# Patient Record
Sex: MALE | Race: WHITE | Employment: OTHER | URBAN - METROPOLITAN AREA
[De-identification: names, ages, dates, MRNs, and addresses within clinical notes are randomized per-mention and may not be internally consistent; named-entity substitution may affect disease eponyms.]

---

## 2022-03-18 PROBLEM — K44.9 HIATAL HERNIA: Status: ACTIVE | Noted: 2021-05-06

## 2022-06-13 ENCOUNTER — HOSPITAL ENCOUNTER (OUTPATIENT)
Dept: GENERAL RADIOLOGY | Age: 67
Discharge: HOME | End: 2022-06-16

## 2022-06-13 ENCOUNTER — HOSPITAL ENCOUNTER (OUTPATIENT)
Dept: PREADMISSION TESTING | Age: 67
Discharge: HOME | End: 2022-06-16
Payer: MEDICARE

## 2022-06-13 VITALS
SYSTOLIC BLOOD PRESSURE: 142 MMHG | RESPIRATION RATE: 16 BRPM | OXYGEN SATURATION: 96 % | HEIGHT: 62 IN | BODY MASS INDEX: 28.87 KG/M2 | TEMPERATURE: 97.3 F | DIASTOLIC BLOOD PRESSURE: 78 MMHG | WEIGHT: 156.9 LBS | HEART RATE: 65 BPM

## 2022-06-13 LAB
ALBUMIN SERPL-MCNC: 3.6 G/DL (ref 3.2–4.6)
ALBUMIN/GLOB SERPL: 1.1 {RATIO} (ref 1.2–3.5)
ALP SERPL-CCNC: 64 U/L (ref 50–136)
ALT SERPL-CCNC: 21 U/L (ref 12–65)
ANION GAP SERPL CALC-SCNC: 8 MMOL/L (ref 7–16)
APPEARANCE UR: CLEAR
APTT PPP: 25.3 SEC (ref 24.1–35.1)
AST SERPL-CCNC: 17 U/L (ref 15–37)
BASOPHILS # BLD: 0 K/UL (ref 0–0.2)
BASOPHILS NFR BLD: 1 % (ref 0–2)
BILIRUB SERPL-MCNC: 1.2 MG/DL (ref 0.2–1.1)
BILIRUB UR QL: NEGATIVE
BUN SERPL-MCNC: 11 MG/DL (ref 8–23)
CALCIUM SERPL-MCNC: 9 MG/DL (ref 8.3–10.4)
CHLORIDE SERPL-SCNC: 107 MMOL/L (ref 98–107)
CO2 SERPL-SCNC: 24 MMOL/L (ref 21–32)
COLOR UR: YELLOW
CREAT SERPL-MCNC: 0.92 MG/DL (ref 0.8–1.5)
DIFFERENTIAL METHOD BLD: ABNORMAL
EKG ATRIAL RATE: 59 BPM
EKG DIAGNOSIS: NORMAL
EKG P AXIS: 35 DEGREES
EKG P-R INTERVAL: 168 MS
EKG Q-T INTERVAL: 420 MS
EKG QRS DURATION: 84 MS
EKG QTC CALCULATION (BAZETT): 415 MS
EKG R AXIS: 16 DEGREES
EKG T AXIS: 3 DEGREES
EKG VENTRICULAR RATE: 59 BPM
EOSINOPHIL # BLD: 0.1 K/UL (ref 0–0.8)
EOSINOPHIL NFR BLD: 1 % (ref 0.5–7.8)
ERYTHROCYTE [DISTWIDTH] IN BLOOD BY AUTOMATED COUNT: 12.4 % (ref 11.9–14.6)
GLOBULIN SER CALC-MCNC: 3.2 G/DL (ref 2.3–3.5)
GLUCOSE SERPL-MCNC: 95 MG/DL (ref 65–100)
GLUCOSE UR STRIP.AUTO-MCNC: NEGATIVE MG/DL
HCT VFR BLD AUTO: 43.7 % (ref 41.1–50.3)
HGB BLD-MCNC: 15.4 G/DL (ref 13.6–17.2)
HGB UR QL STRIP: NEGATIVE
IMM GRANULOCYTES # BLD AUTO: 0 K/UL (ref 0–0.5)
IMM GRANULOCYTES NFR BLD AUTO: 0 % (ref 0–5)
INR PPP: 1.1
KETONES UR QL STRIP.AUTO: NEGATIVE MG/DL
LEUKOCYTE ESTERASE UR QL STRIP.AUTO: NEGATIVE
LYMPHOCYTES # BLD: 2.1 K/UL (ref 0.5–4.6)
LYMPHOCYTES NFR BLD: 34 % (ref 13–44)
MCH RBC QN AUTO: 33.8 PG (ref 26.1–32.9)
MCHC RBC AUTO-ENTMCNC: 35.2 G/DL (ref 31.4–35)
MCV RBC AUTO: 95.8 FL (ref 79.6–97.8)
MONOCYTES # BLD: 0.7 K/UL (ref 0.1–1.3)
MONOCYTES NFR BLD: 11 % (ref 4–12)
NEUTS SEG # BLD: 3.3 K/UL (ref 1.7–8.2)
NEUTS SEG NFR BLD: 53 % (ref 43–78)
NITRITE UR QL STRIP.AUTO: NEGATIVE
NRBC # BLD: 0 K/UL (ref 0–0.2)
PH UR STRIP: 7 [PH] (ref 5–9)
PLATELET # BLD AUTO: 261 K/UL (ref 150–450)
PMV BLD AUTO: 9.2 FL (ref 9.4–12.3)
POTASSIUM SERPL-SCNC: 4 MMOL/L (ref 3.5–5.1)
PROT SERPL-MCNC: 6.8 G/DL (ref 6.3–8.2)
PROT UR STRIP-MCNC: NEGATIVE MG/DL
PROTHROMBIN TIME: 14.5 SEC (ref 12.6–14.5)
RBC # BLD AUTO: 4.56 M/UL (ref 4.23–5.6)
SODIUM SERPL-SCNC: 139 MMOL/L (ref 136–145)
SP GR UR REFRACTOMETRY: 1.01 (ref 1–1.02)
UROBILINOGEN UR QL STRIP.AUTO: 1 EU/DL (ref 0.2–1)
WBC # BLD AUTO: 6.2 K/UL (ref 4.3–11.1)

## 2022-06-13 PROCEDURE — 85025 COMPLETE CBC W/AUTO DIFF WBC: CPT

## 2022-06-13 PROCEDURE — 71046 X-RAY EXAM CHEST 2 VIEWS: CPT

## 2022-06-13 PROCEDURE — 85730 THROMBOPLASTIN TIME PARTIAL: CPT

## 2022-06-13 PROCEDURE — 81003 URINALYSIS AUTO W/O SCOPE: CPT

## 2022-06-13 PROCEDURE — 85610 PROTHROMBIN TIME: CPT

## 2022-06-13 PROCEDURE — 80053 COMPREHEN METABOLIC PANEL: CPT

## 2022-06-13 NOTE — PERIOP NOTE
Results for Celeste Gibbs (MRN [id number]) as of 6/13/2022 14:33   Ref.  Range 6/13/2022 10:29   Sodium Latest Ref Range: 136 - 145 mmol/L 139   Potassium Latest Ref Range: 3.5 - 5.1 mmol/L 4.0   Chloride Latest Ref Range: 98 - 107 mmol/L 107   CO2 Latest Ref Range: 21 - 32 mmol/L 24   BUN,BUNPL Latest Ref Range: 8 - 23 MG/DL 11   Creatinine Latest Ref Range: 0.8 - 1.5 MG/DL 0.92   Anion Gap Latest Ref Range: 7 - 16 mmol/L 8   GFR Non- Latest Ref Range: >60 ml/min/1.73m2 >60   GFR African American Latest Ref Range: >60 ml/min/1.73m2 >60   GLUCOSE, FASTING,GF Latest Ref Range: 65 - 100 mg/dL 95   CALCIUM, SERUM, [id number] Latest Ref Range: 8.3 - 10.4 MG/DL 9.0   ALBUMIN/GLOBULIN RATIO Latest Ref Range: 1.2 - 3.5   1.1 (L)   Total Protein Latest Ref Range: 6.3 - 8.2 g/dL 6.8   Albumin Latest Ref Range: 3.2 - 4.6 g/dL 3.6   Globulin Latest Ref Range: 2.3 - 3.5 g/dL 3.2   Alk Phosphatase Latest Ref Range: 50 - 136 U/L 64   ALT Latest Ref Range: 12 - 65 U/L 21   AST Latest Ref Range: 15 - 37 U/L 17   Bilirubin Latest Ref Range: 0.2 - 1.1 MG/DL 1.2 (H)   WBC Latest Ref Range: 4.3 - 11.1 K/uL 6.2   RBC Latest Ref Range: 4.23 - 5.6 M/uL 4.56   Hemoglobin Quant Latest Ref Range: 13.6 - 17.2 g/dL 15.4   Hematocrit Latest Ref Range: 41.1 - 50.3 % 43.7   MCV Latest Ref Range: 79.6 - 97.8 FL 95.8   MCH Latest Ref Range: 26.1 - 32.9 PG 33.8 (H)   MCHC Latest Ref Range: 31.4 - 35.0 g/dL 35.2 (H)   MPV Latest Ref Range: 9.4 - 12.3 FL 9.2 (L)   RDW Latest Ref Range: 11.9 - 14.6 % 12.4   Platelet Count Latest Ref Range: 150 - 450 K/uL 261   Absolute Mono # Latest Ref Range: 0.1 - 1.3 K/UL 0.7   Eosinophils % Latest Ref Range: 0.5 - 7.8 % 1   Basophils Absolute Latest Ref Range: 0.0 - 0.2 K/UL 0.0   Differential Type Latest Units:   AUTOMATED   Seg Neutrophils Latest Ref Range: 43 - 78 % 53   Segs Absolute Latest Ref Range: 1.7 - 8.2 K/UL 3.3   Lymphocytes Latest Ref Range: 13 - 44 % 34   Absolute Lymph # Latest Ref Range: 0.5 - 4.6 K/UL 2.1   Monocytes Latest Ref Range: 4.0 - 12.0 % 11   Absolute Eos # Latest Ref Range: 0.0 - 0.8 K/UL 0.1   Basophils Latest Ref Range: 0.0 - 2.0 % 1   Immature Granulocytes Latest Ref Range: 0.0 - 5.0 % 0   Nucleated Red Blood Cells Latest Ref Range: 0.0 - 0.2 K/uL 0.00   Prothrombin Time Latest Ref Range: 12.6 - 14.5 sec 14.5   INR Latest Units:   1.1   PTT Latest Ref Range: 24.1 - 35.1 SEC 25.3   Color, UA Latest Units:   YELLOW   Glucose, UA Latest Units: mg/dL Negative   Bilirubin, Urine Latest Ref Range: NEG   Negative   Ketones, Urine Latest Ref Range: NEG mg/dL Negative   Specific Gravity, UA Latest Ref Range: 1.001 - 1.023   1.014   Blood, Urine Latest Ref Range: NEG   Negative   Protein, UA Latest Ref Range: NEG mg/dL Negative   Urobilinogen, Urine Latest Ref Range: 0.2 - 1.0 EU/dL 1.0   Nitrite, Urine Latest Ref Range: NEG   Negative   Leukocyte Esterase, Urine Latest Ref Range: NEG   Negative   Appearance Latest Units:   CLEAR   pH, Urine Latest Ref Range: 5.0 - 9.0   7.0

## 2022-06-13 NOTE — PERIOP NOTE
Patient verified name and     Order for consent was not found in EHR ; patient verified. Type II surgery, walk-in assessment complete. Labs per surgeon: CBC, CMP, PT/PTT, UA, CXR  Labs per anesthesia protocol: NO ADDITIONAL  EKG: done today    [facility] approved surgical skin cleanser and instructions given per hospital policy. Patient provided with and instructed on educational handouts including Guide to Surgery, Pain Management, Hand Hygiene, Blood Transfusion Education, and [facility] Brochure. Patient answered medical/surgical history questions at their best of ability. All prior to admission medications documented in [facility]. Original medication prescription bottle not visualized during patient appointment. Patient instructed to hold all vitamins 7 days prior to surgery and NSAIDS 5 days prior to surgery, patient verbalized understanding. Patient teach back successful and patient demonstrates knowledge of instructions.

## 2022-06-13 NOTE — PERIOP NOTE
PLEASE CONTINUE TAKING ALL PRESCRIPTION MEDICATIONS UP TO THE DAY OF SURGERY UNLESS OTHERWISE DIRECTED BELOW. DISCONTINUE all vitamins and supplements y days prior to surgery. DISCONTINUE Non-Steriodal Anti-Inflammatory (NSAIDS) such as Advil and Aleve 5 days prior to surgery. Home Medications to take  the day of surgery   Omeprazole (Prilosec)             Home Medications   to Hold           Comments       On the day before surgery please take Acetaminophen 1000mg in the morning and then again before bed. You may substitute for Tylenol 650 mg. Please do not bring home medications with you on the day of surgery unless otherwise directed by your nurse. If you are instructed to bring home medications, please give them to your nurse as they will be administered by the nursing staff. If you have any questions, please call [address] [place] [address] [phone] or [facility] [phone]. A copy of this note was provided to the patient for reference.

## 2022-06-17 NOTE — PERIOP NOTE
Directly informed patient and or family member of pre op arrival time 200 on 6/20. All questions answered. Pre op instructions reviewed. Left contact information for any additional questions or needs.

## 2022-06-20 ENCOUNTER — ANESTHESIA (OUTPATIENT)
Dept: SURGERY | Age: 67
DRG: 328 | End: 2022-06-20
Payer: MEDICARE

## 2022-06-20 ENCOUNTER — ANESTHESIA EVENT (OUTPATIENT)
Dept: SURGERY | Age: 67
DRG: 328 | End: 2022-06-20
Payer: MEDICARE

## 2022-06-20 ENCOUNTER — HOSPITAL ENCOUNTER (INPATIENT)
Age: 67
LOS: 1 days | Discharge: HOME | DRG: 328 | End: 2022-06-21
Attending: SURGERY | Admitting: SURGERY
Payer: MEDICARE

## 2022-06-20 DIAGNOSIS — K44.9 HIATAL HERNIA: Primary | ICD-10-CM

## 2022-06-20 PROCEDURE — 2720000010 HC SURG SUPPLY STERILE: Performed by: SURGERY

## 2022-06-20 PROCEDURE — 6360000002 HC RX W HCPCS: Performed by: ANESTHESIOLOGY

## 2022-06-20 PROCEDURE — 6370000000 HC RX 637 (ALT 250 FOR IP): Performed by: PHYSICIAN ASSISTANT

## 2022-06-20 PROCEDURE — 7100000000 HC PACU RECOVERY - FIRST 15 MIN: Performed by: SURGERY

## 2022-06-20 PROCEDURE — 6360000002 HC RX W HCPCS: Performed by: PHYSICIAN ASSISTANT

## 2022-06-20 PROCEDURE — 2580000003 HC RX 258

## 2022-06-20 PROCEDURE — 2709999900 HC NON-CHARGEABLE SUPPLY: Performed by: SURGERY

## 2022-06-20 PROCEDURE — 43246 EGD PLACE GASTROSTOMY TUBE: CPT | Performed by: SURGERY

## 2022-06-20 PROCEDURE — 2500000003 HC RX 250 WO HCPCS: Performed by: PHYSICIAN ASSISTANT

## 2022-06-20 PROCEDURE — 3600000014 HC SURGERY LEVEL 4 ADDTL 15MIN: Performed by: SURGERY

## 2022-06-20 PROCEDURE — 2500000003 HC RX 250 WO HCPCS: Performed by: NURSE ANESTHETIST, CERTIFIED REGISTERED

## 2022-06-20 PROCEDURE — 2580000003 HC RX 258: Performed by: ANESTHESIOLOGY

## 2022-06-20 PROCEDURE — 0DH68UZ INSERTION OF FEEDING DEVICE INTO STOMACH, VIA NATURAL OR ARTIFICIAL OPENING ENDOSCOPIC: ICD-10-PCS | Performed by: SURGERY

## 2022-06-20 PROCEDURE — 3700000000 HC ANESTHESIA ATTENDED CARE: Performed by: SURGERY

## 2022-06-20 PROCEDURE — 3600000004 HC SURGERY LEVEL 4 BASE: Performed by: SURGERY

## 2022-06-20 PROCEDURE — 7100000001 HC PACU RECOVERY - ADDTL 15 MIN: Performed by: SURGERY

## 2022-06-20 PROCEDURE — 43281 LAP PARAESOPHAG HERN REPAIR: CPT | Performed by: PHYSICIAN ASSISTANT

## 2022-06-20 PROCEDURE — 0BQT4ZZ REPAIR DIAPHRAGM, PERCUTANEOUS ENDOSCOPIC APPROACH: ICD-10-PCS | Performed by: SURGERY

## 2022-06-20 PROCEDURE — C1727 CATH, BAL TIS DIS, NON-VAS: HCPCS | Performed by: SURGERY

## 2022-06-20 PROCEDURE — 43246 EGD PLACE GASTROSTOMY TUBE: CPT | Performed by: PHYSICIAN ASSISTANT

## 2022-06-20 PROCEDURE — 6360000002 HC RX W HCPCS: Performed by: SURGERY

## 2022-06-20 PROCEDURE — 2580000003 HC RX 258: Performed by: PHYSICIAN ASSISTANT

## 2022-06-20 PROCEDURE — 43281 LAP PARAESOPHAG HERN REPAIR: CPT | Performed by: SURGERY

## 2022-06-20 PROCEDURE — 6370000000 HC RX 637 (ALT 250 FOR IP): Performed by: ANESTHESIOLOGY

## 2022-06-20 PROCEDURE — 2700000000 HC OXYGEN THERAPY PER DAY

## 2022-06-20 PROCEDURE — 1100000000 HC RM PRIVATE

## 2022-06-20 PROCEDURE — 6360000002 HC RX W HCPCS

## 2022-06-20 PROCEDURE — 3700000001 HC ADD 15 MINUTES (ANESTHESIA): Performed by: SURGERY

## 2022-06-20 PROCEDURE — 2500000003 HC RX 250 WO HCPCS: Performed by: SURGERY

## 2022-06-20 PROCEDURE — 2580000003 HC RX 258: Performed by: NURSE ANESTHETIST, CERTIFIED REGISTERED

## 2022-06-20 PROCEDURE — 6360000002 HC RX W HCPCS: Performed by: NURSE ANESTHETIST, CERTIFIED REGISTERED

## 2022-06-20 RX ORDER — SODIUM CHLORIDE 0.9 % (FLUSH) 0.9 %
5-40 SYRINGE (ML) INJECTION PRN
Status: DISCONTINUED | OUTPATIENT
Start: 2022-06-20 | End: 2022-06-20 | Stop reason: HOSPADM

## 2022-06-20 RX ORDER — SODIUM CHLORIDE 0.9 % (FLUSH) 0.9 %
5-40 SYRINGE (ML) INJECTION EVERY 12 HOURS SCHEDULED
Status: DISCONTINUED | OUTPATIENT
Start: 2022-06-20 | End: 2022-06-20 | Stop reason: HOSPADM

## 2022-06-20 RX ORDER — HYDROMORPHONE HYDROCHLORIDE 1 MG/ML
1 INJECTION, SOLUTION INTRAMUSCULAR; INTRAVENOUS; SUBCUTANEOUS EVERY 4 HOURS PRN
Status: DISCONTINUED | OUTPATIENT
Start: 2022-06-20 | End: 2022-06-21 | Stop reason: HOSPADM

## 2022-06-20 RX ORDER — SODIUM CHLORIDE 9 MG/ML
INJECTION, SOLUTION INTRAVENOUS PRN
Status: DISCONTINUED | OUTPATIENT
Start: 2022-06-20 | End: 2022-06-20 | Stop reason: HOSPADM

## 2022-06-20 RX ORDER — ACETAMINOPHEN 500 MG
1000 TABLET ORAL ONCE
Status: COMPLETED | OUTPATIENT
Start: 2022-06-20 | End: 2022-06-20

## 2022-06-20 RX ORDER — IPRATROPIUM BROMIDE AND ALBUTEROL SULFATE 2.5; .5 MG/3ML; MG/3ML
1 SOLUTION RESPIRATORY (INHALATION)
Status: DISCONTINUED | OUTPATIENT
Start: 2022-06-20 | End: 2022-06-20 | Stop reason: HOSPADM

## 2022-06-20 RX ORDER — SODIUM CHLORIDE 0.9 % (FLUSH) 0.9 %
5-40 SYRINGE (ML) INJECTION EVERY 12 HOURS SCHEDULED
Status: DISCONTINUED | OUTPATIENT
Start: 2022-06-20 | End: 2022-06-21 | Stop reason: HOSPADM

## 2022-06-20 RX ORDER — FENTANYL CITRATE 50 UG/ML
INJECTION, SOLUTION INTRAMUSCULAR; INTRAVENOUS PRN
Status: DISCONTINUED | OUTPATIENT
Start: 2022-06-20 | End: 2022-06-20 | Stop reason: SDUPTHER

## 2022-06-20 RX ORDER — ACETAMINOPHEN 325 MG/1
650 TABLET ORAL EVERY 6 HOURS
Status: DISCONTINUED | OUTPATIENT
Start: 2022-06-20 | End: 2022-06-21 | Stop reason: HOSPADM

## 2022-06-20 RX ORDER — ENOXAPARIN SODIUM 100 MG/ML
40 INJECTION SUBCUTANEOUS DAILY
Status: DISCONTINUED | OUTPATIENT
Start: 2022-06-21 | End: 2022-06-21 | Stop reason: HOSPADM

## 2022-06-20 RX ORDER — ROCURONIUM BROMIDE 10 MG/ML
INJECTION, SOLUTION INTRAVENOUS PRN
Status: DISCONTINUED | OUTPATIENT
Start: 2022-06-20 | End: 2022-06-20 | Stop reason: SDUPTHER

## 2022-06-20 RX ORDER — HALOPERIDOL 5 MG/ML
1 INJECTION INTRAMUSCULAR
Status: DISCONTINUED | OUTPATIENT
Start: 2022-06-20 | End: 2022-06-20 | Stop reason: HOSPADM

## 2022-06-20 RX ORDER — ONDANSETRON 2 MG/ML
4 INJECTION INTRAMUSCULAR; INTRAVENOUS EVERY 6 HOURS PRN
Status: DISCONTINUED | OUTPATIENT
Start: 2022-06-20 | End: 2022-06-21 | Stop reason: HOSPADM

## 2022-06-20 RX ORDER — NEOSTIGMINE METHYLSULFATE 1 MG/ML
INJECTION, SOLUTION INTRAVENOUS PRN
Status: DISCONTINUED | OUTPATIENT
Start: 2022-06-20 | End: 2022-06-20 | Stop reason: SDUPTHER

## 2022-06-20 RX ORDER — METRONIDAZOLE 500 MG/100ML
500 INJECTION, SOLUTION INTRAVENOUS EVERY 8 HOURS
Status: COMPLETED | OUTPATIENT
Start: 2022-06-20 | End: 2022-06-20

## 2022-06-20 RX ORDER — ONDANSETRON 4 MG/1
4 TABLET, ORALLY DISINTEGRATING ORAL EVERY 4 HOURS PRN
Status: DISCONTINUED | OUTPATIENT
Start: 2022-06-20 | End: 2022-06-21 | Stop reason: HOSPADM

## 2022-06-20 RX ORDER — GLYCOPYRROLATE 0.2 MG/ML
INJECTION INTRAMUSCULAR; INTRAVENOUS PRN
Status: DISCONTINUED | OUTPATIENT
Start: 2022-06-20 | End: 2022-06-20 | Stop reason: SDUPTHER

## 2022-06-20 RX ORDER — HYDROMORPHONE HYDROCHLORIDE 2 MG/ML
0.5 INJECTION, SOLUTION INTRAMUSCULAR; INTRAVENOUS; SUBCUTANEOUS EVERY 5 MIN PRN
Status: DISCONTINUED | OUTPATIENT
Start: 2022-06-20 | End: 2022-06-20 | Stop reason: HOSPADM

## 2022-06-20 RX ORDER — PANTOPRAZOLE SODIUM 40 MG/1
40 TABLET, DELAYED RELEASE ORAL
Status: DISCONTINUED | OUTPATIENT
Start: 2022-06-21 | End: 2022-06-21 | Stop reason: HOSPADM

## 2022-06-20 RX ORDER — SODIUM CHLORIDE, SODIUM LACTATE, POTASSIUM CHLORIDE, CALCIUM CHLORIDE 600; 310; 30; 20 MG/100ML; MG/100ML; MG/100ML; MG/100ML
INJECTION, SOLUTION INTRAVENOUS CONTINUOUS
Status: DISCONTINUED | OUTPATIENT
Start: 2022-06-20 | End: 2022-06-21 | Stop reason: HOSPADM

## 2022-06-20 RX ORDER — FENTANYL CITRATE 50 UG/ML
100 INJECTION, SOLUTION INTRAMUSCULAR; INTRAVENOUS
Status: DISCONTINUED | OUTPATIENT
Start: 2022-06-20 | End: 2022-06-20 | Stop reason: HOSPADM

## 2022-06-20 RX ORDER — PROCHLORPERAZINE EDISYLATE 5 MG/ML
5 INJECTION INTRAMUSCULAR; INTRAVENOUS
Status: DISCONTINUED | OUTPATIENT
Start: 2022-06-20 | End: 2022-06-20 | Stop reason: HOSPADM

## 2022-06-20 RX ORDER — LIDOCAINE HYDROCHLORIDE 20 MG/ML
INJECTION, SOLUTION EPIDURAL; INFILTRATION; INTRACAUDAL; PERINEURAL PRN
Status: DISCONTINUED | OUTPATIENT
Start: 2022-06-20 | End: 2022-06-20 | Stop reason: SDUPTHER

## 2022-06-20 RX ORDER — KETOROLAC TROMETHAMINE 30 MG/ML
INJECTION, SOLUTION INTRAMUSCULAR; INTRAVENOUS PRN
Status: DISCONTINUED | OUTPATIENT
Start: 2022-06-20 | End: 2022-06-20 | Stop reason: SDUPTHER

## 2022-06-20 RX ORDER — DEXAMETHASONE SODIUM PHOSPHATE 4 MG/ML
INJECTION, SOLUTION INTRA-ARTICULAR; INTRALESIONAL; INTRAMUSCULAR; INTRAVENOUS; SOFT TISSUE PRN
Status: DISCONTINUED | OUTPATIENT
Start: 2022-06-20 | End: 2022-06-20 | Stop reason: SDUPTHER

## 2022-06-20 RX ORDER — SODIUM CHLORIDE, SODIUM LACTATE, POTASSIUM CHLORIDE, CALCIUM CHLORIDE 600; 310; 30; 20 MG/100ML; MG/100ML; MG/100ML; MG/100ML
INJECTION, SOLUTION INTRAVENOUS CONTINUOUS
Status: DISCONTINUED | OUTPATIENT
Start: 2022-06-20 | End: 2022-06-20 | Stop reason: HOSPADM

## 2022-06-20 RX ORDER — PROPOFOL 10 MG/ML
INJECTION, EMULSION INTRAVENOUS PRN
Status: DISCONTINUED | OUTPATIENT
Start: 2022-06-20 | End: 2022-06-20 | Stop reason: SDUPTHER

## 2022-06-20 RX ORDER — OXYCODONE HYDROCHLORIDE 5 MG/1
5 TABLET ORAL EVERY 4 HOURS PRN
Status: DISCONTINUED | OUTPATIENT
Start: 2022-06-20 | End: 2022-06-21 | Stop reason: HOSPADM

## 2022-06-20 RX ORDER — GABAPENTIN 300 MG/1
300 CAPSULE ORAL 3 TIMES DAILY
Status: DISCONTINUED | OUTPATIENT
Start: 2022-06-20 | End: 2022-06-21 | Stop reason: HOSPADM

## 2022-06-20 RX ORDER — MIDAZOLAM HYDROCHLORIDE 2 MG/2ML
2 INJECTION, SOLUTION INTRAMUSCULAR; INTRAVENOUS
Status: DISCONTINUED | OUTPATIENT
Start: 2022-06-20 | End: 2022-06-20 | Stop reason: HOSPADM

## 2022-06-20 RX ORDER — OXYCODONE HYDROCHLORIDE 5 MG/1
5 TABLET ORAL
Status: DISCONTINUED | OUTPATIENT
Start: 2022-06-20 | End: 2022-06-20 | Stop reason: HOSPADM

## 2022-06-20 RX ORDER — BUPIVACAINE HYDROCHLORIDE 5 MG/ML
INJECTION, SOLUTION EPIDURAL; INTRACAUDAL PRN
Status: DISCONTINUED | OUTPATIENT
Start: 2022-06-20 | End: 2022-06-20 | Stop reason: ALTCHOICE

## 2022-06-20 RX ORDER — ONDANSETRON 2 MG/ML
INJECTION INTRAMUSCULAR; INTRAVENOUS PRN
Status: DISCONTINUED | OUTPATIENT
Start: 2022-06-20 | End: 2022-06-20 | Stop reason: SDUPTHER

## 2022-06-20 RX ADMIN — OXYCODONE 5 MG: 5 TABLET ORAL at 22:11

## 2022-06-20 RX ADMIN — PHENYLEPHRINE HYDROCHLORIDE 100 MCG: 10 INJECTION INTRAVENOUS at 11:11

## 2022-06-20 RX ADMIN — METRONIDAZOLE 500 MG: 500 INJECTION, SOLUTION INTRAVENOUS at 15:44

## 2022-06-20 RX ADMIN — KETOROLAC TROMETHAMINE 30 MG: 30 INJECTION, SOLUTION INTRAMUSCULAR; INTRAVENOUS at 12:14

## 2022-06-20 RX ADMIN — HYDROMORPHONE HYDROCHLORIDE 0.5 MG: 2 INJECTION, SOLUTION INTRAMUSCULAR; INTRAVENOUS; SUBCUTANEOUS at 13:24

## 2022-06-20 RX ADMIN — ROCURONIUM BROMIDE 10 MG: 50 INJECTION, SOLUTION INTRAVENOUS at 11:14

## 2022-06-20 RX ADMIN — SODIUM CHLORIDE, POTASSIUM CHLORIDE, SODIUM LACTATE AND CALCIUM CHLORIDE: 600; 310; 30; 20 INJECTION, SOLUTION INTRAVENOUS at 09:12

## 2022-06-20 RX ADMIN — SODIUM CHLORIDE, POTASSIUM CHLORIDE, SODIUM LACTATE AND CALCIUM CHLORIDE: 600; 310; 30; 20 INJECTION, SOLUTION INTRAVENOUS at 15:43

## 2022-06-20 RX ADMIN — ROCURONIUM BROMIDE 10 MG: 50 INJECTION, SOLUTION INTRAVENOUS at 11:43

## 2022-06-20 RX ADMIN — ROCURONIUM BROMIDE 10 MG: 50 INJECTION, SOLUTION INTRAVENOUS at 12:00

## 2022-06-20 RX ADMIN — DEXAMETHASONE SODIUM PHOSPHATE 4 MG: 4 INJECTION, SOLUTION INTRAMUSCULAR; INTRAVENOUS at 11:40

## 2022-06-20 RX ADMIN — OXYCODONE 5 MG: 5 TABLET ORAL at 17:51

## 2022-06-20 RX ADMIN — ACETAMINOPHEN 1000 MG: 500 TABLET, FILM COATED ORAL at 09:12

## 2022-06-20 RX ADMIN — Medication 3 MG: at 12:29

## 2022-06-20 RX ADMIN — FENTANYL CITRATE 100 MCG: 50 INJECTION, SOLUTION INTRAMUSCULAR; INTRAVENOUS at 10:35

## 2022-06-20 RX ADMIN — CEFAZOLIN SODIUM 2000 MG: 100 INJECTION, POWDER, LYOPHILIZED, FOR SOLUTION INTRAVENOUS at 20:54

## 2022-06-20 RX ADMIN — ONDANSETRON 4 MG: 2 INJECTION INTRAMUSCULAR; INTRAVENOUS at 11:40

## 2022-06-20 RX ADMIN — PROPOFOL 160 MG: 10 INJECTION, EMULSION INTRAVENOUS at 10:35

## 2022-06-20 RX ADMIN — ROCURONIUM BROMIDE 50 MG: 50 INJECTION, SOLUTION INTRAVENOUS at 10:36

## 2022-06-20 RX ADMIN — SODIUM CHLORIDE, PRESERVATIVE FREE 10 ML: 5 INJECTION INTRAVENOUS at 21:00

## 2022-06-20 RX ADMIN — Medication 2000 MG: at 10:42

## 2022-06-20 RX ADMIN — ACETAMINOPHEN 650 MG: 325 TABLET ORAL at 15:45

## 2022-06-20 RX ADMIN — ACETAMINOPHEN 650 MG: 325 TABLET ORAL at 20:54

## 2022-06-20 RX ADMIN — METRONIDAZOLE 500 MG: 500 INJECTION, SOLUTION INTRAVENOUS at 22:51

## 2022-06-20 RX ADMIN — LIDOCAINE HYDROCHLORIDE 60 MG: 20 INJECTION, SOLUTION EPIDURAL; INFILTRATION; INTRACAUDAL; PERINEURAL at 10:35

## 2022-06-20 RX ADMIN — GLYCOPYRROLATE 0.4 MG: 0.2 INJECTION, SOLUTION INTRAMUSCULAR; INTRAVENOUS at 12:29

## 2022-06-20 RX ADMIN — GABAPENTIN 300 MG: 300 CAPSULE ORAL at 20:54

## 2022-06-20 RX ADMIN — GABAPENTIN 300 MG: 300 CAPSULE ORAL at 15:46

## 2022-06-20 ASSESSMENT — PAIN SCALES - GENERAL
PAINLEVEL_OUTOF10: 7
PAINLEVEL_OUTOF10: 0
PAINLEVEL_OUTOF10: 6
PAINLEVEL_OUTOF10: 2
PAINLEVEL_OUTOF10: 4
PAINLEVEL_OUTOF10: 4

## 2022-06-20 ASSESSMENT — PAIN - FUNCTIONAL ASSESSMENT
PAIN_FUNCTIONAL_ASSESSMENT: 0-10
PAIN_FUNCTIONAL_ASSESSMENT: NONE - DENIES PAIN
PAIN_FUNCTIONAL_ASSESSMENT: 0-10

## 2022-06-20 ASSESSMENT — PAIN DESCRIPTION - PAIN TYPE: TYPE: SURGICAL PAIN

## 2022-06-20 ASSESSMENT — PAIN DESCRIPTION - LOCATION
LOCATION: ABDOMEN

## 2022-06-20 ASSESSMENT — PAIN DESCRIPTION - ORIENTATION
ORIENTATION: ANTERIOR
ORIENTATION: LOWER;MID

## 2022-06-20 ASSESSMENT — PAIN DESCRIPTION - DESCRIPTORS
DESCRIPTORS: ACHING
DESCRIPTORS: SORE

## 2022-06-20 NOTE — H&P
[facility]   [facility]. [address] [place] [address]   [phone]       Patient:  Rya Hoff   : 1955      HPI   Ray Hoff is a  69 y.o. male who presents today with a hiatal hernia. This patient was seen in my office 1 year ago with a hiatal hernia. At that time he was found to have a [id number] cm hiatal hernia with gastric volvulus on CT scan. At that time the patient stated his symptoms were well controlled with medication and wanted  to watch the hernia and not proceed with surgery at that time. The patient had a recent CT scan on 4/15/2022 which again revealed a large hiatal hernia with majority of the stomach in the thorax. The patient states that his symptoms have been worsening over the last year. He states that he is getting episodes of abdominal pain an pressure that can happen at random, but is noticeable after eating as well. He states that he is afraid to eat due  to the symptoms. He states that he will get associated nausea, and will feel like he is about to pass out when he has the symptoms. He states that the episodes are sporadic. He will have the symptoms 2-3 days in a row, and then can go weeks with no symptoms. He states he is not having issues with GERD at this time. He denies any SOB. He has not had any previous abdominal operations. He will occasionally take an 81mg aspirin. Past Medical History:        Diagnosis  Date           GERD (gastroesophageal reflux disease)                   Current Outpatient Medications          Medication  Sig  Dispense  Refill             Wheat Dextrin (Benefiber Clear) 3 gram/3.5 gram pwpk  Take  by mouth.   omeprazole (PRILOSEC) 40 mg capsule  TAKE 1 CAPSULE BY MOUTH ONCE DAILY           aspirin delayed-release 81 mg tablet  Take  by mouth daily.  (Patient not taking: Reported on 2022)                 ergocalciferol (ERGOCALCIFEROL) 1,250 mcg (50,000 unit) capsule  TAKE 1 CAPSULE BY MOUTH ONCE WEEKLY (Patient not taking: Reported on 5/20/2022)            No Known Allergies   History reviewed. No pertinent surgical history. History reviewed. No pertinent family history. Social History               Tobacco Use           Smoking status:  Never Smoker       Smokeless tobacco:  Never Used       Substance Use Topics           Alcohol use:  Not on file            Review of Systems    A comprehensive review of systems was negative except for that written in  the HPI. Physical Exam   Visit Vitals      BP  (!) 142/84     Pulse  71     Ht  5' 4\" (1.626 m)     Wt  71.2 kg (157 lb)     SpO2  98%        BMI  26.95 kg/m²            General:         Alert, oriented, cooperative, awake patient in no acute distress    Skin:               Warm, moist with good texture    Eyes:              Sclera are clear, extraocular muscles intact   HENT:                        Normocephalic; oral mucosa moist, nares patent; neck is supple; trachea midline   Respiratory:    Lungs clear to auscultation bilaterally, breathing is non-labored    Chest:             Symmetric throughout one respiratory excursion; no supraclavicular lymphadenopathy   CV:                 Regular rate and rhythm, no appreciable murmurs, rubs, gallops   Abdomen:       Soft, protuberant but non-distended; bowel sounds are normoactive    Extremities:    No cyanosis, clubbing or edema   Neurological:  No focal signs         Labs:  No results found for: APTT, PTP, INR, INREXT       CT:                  Assessment/Plan:    Dickson Peterson is a 69 y.o. male who has signs and symptoms consistent with a large hiatal hernia. It was discussed with the patient that as his symptoms are worsening, it is recommended that he have his large hiatal hernia repaired. The patient wishes to proceed with surgery at this time as well. It was discussed that we will be performing the procedure laparoscopically.  We will be reducing the stomach from the chest, then depending on the size of his hernia, we may close hiatal defect with  mesh, or if it is too large we will leave it open and pexy the stomach to the abdominal wall. We will decide this at the time of his operation. As he is not having any GERD type symptoms he  will likely not need a fundoplication. Discussed the patient's condition and treatment options with the patient. Discussed risks of surgery in language the patient could understand. The patient voiced understanding of all this and all questions were answered. Alternatives to surgery were  discussed also and risks of the alternatives. The patient requested that we proceed with surgery.         Total time spent with patient including chart review is 45 minutes         Yee Choudhury MD

## 2022-06-20 NOTE — BRIEF OP NOTE
Brief Postoperative Note      Patient: Janay Donis  YOB: 1955  MRN: [id number]    Date of Procedure: 6/20/2022    Pre-Op Diagnosis: incarcerated Hiatal hernia [K44.9]    Post-Op Diagnosis: Same       Procedure:  Laparoscopic hiatal hernia repair by hernia reduction and sac excision  G tube pexy    Surgeon(s):  Pete Omalley MD    Assistant:  Surgical Assistant: Josephine DELANEY    Anesthesia: General    Estimated Blood Loss (mL): Minimal    Complications: None    Specimens:   * No specimens in log *    Implants:  * No implants in log *      Drains:   Gastrostomy/Enterostomy/Jejunostomy Tube Percutaneous Endoscopic Gastrostomy (PEG) LUQ 1 20 fr (Active)       [REMOVED] Urinary Catheter 2 Way; Chaudhry (Removed)       Findings: large hiatal defect with incarcerated stomach    Electronically signed by Pete Omalley MD on 6/20/2022 at 12:46 PM

## 2022-06-20 NOTE — ANESTHESIA PROCEDURE NOTES
Airway  Date/Time: 6/20/2022 10:36 AM  Urgency: elective      General Information and Staff    Patient location during procedure: OR    Indications and Patient Condition  Indications for airway management: anesthesia  Spontaneous Ventilation: absent  Sedation level: deep  Preoxygenated: yes  Patient position: sniffing  MILS maintained throughout  Mask difficulty assessment: vent by bag mask + OA or adjuvant +/- NMBA    Final Airway Details  Final airway type: endotracheal airway      Successful airway: ETT  Cuffed: yes   Facilitating devices/methods: intubating stylet  Blade: Jonathan  Blade size: #4  ETT size (mm): 8.0  Placement verified by: chest auscultation and capnometry   Inital cuff pressure (cm H2O): 6  Measured from: lips  ETT to lips (cm): 23  Number of attempts at approach: 1  Ventilation between attempts: bag mask  Number of other approaches attempted: 0

## 2022-06-20 NOTE — ANESTHESIA POSTPROCEDURE EVALUATION
Department of Anesthesiology  Postprocedure Note    Patient: Jen Null  MRN: [id number]  YOB: 1955  Date of evaluation: 6/20/2022      Procedure Summary     Date: 06/20/22 Room / Location: [facility] MAIN OR 02 / [facility] MAIN OR    Anesthesia Start: 1032 Anesthesia Stop: 8290    Procedures: HERNIA HIATAL LAPAROSCOPIC (N/A Abdomen)      POSS  PEG TUBE PLACEMENT (N/A Abdomen) Diagnosis:       Hiatal hernia      (Hiatal hernia [K44.9])    Providers: Douglas Mooney MD Responsible Provider: Courtney Esteban MD    Anesthesia Type: General ASA Status: 2          Anesthesia Type: General    Wanda Phase I: Wanda Score: 8    Wanda Phase II:      Last vitals:   Vitals Value Taken Time   /65 06/20/22 1345   Temp 97.2 °F (36.2 °C) 06/20/22 1255   Pulse 60 06/20/22 1349   Resp 16 06/20/22 1335   SpO2 98 % 06/20/22 1349   Vitals shown include unvalidated device data.       Anesthesia Post Evaluation    Patient location during evaluation: PACU  Patient participation: complete - patient participated  Level of consciousness: awake  Airway patency: patent  Nausea & Vomiting: no nausea  Complications: no  Cardiovascular status: hemodynamically stable  Respiratory status: acceptable and nonlabored ventilation  Hydration status: stable  Multimodal analgesia pain management approach

## 2022-06-20 NOTE — PERIOP NOTE
TRANSFER - OUT REPORT:    Verbal report given to Shannen CARTER on Amaury Deluna  being transferred to [facility] for routine post-op       Report consisted of patients Situation, Background, Assessment and   Recommendations(SBAR). Information from the following report(s) Nurse Handoff Report, Adult Overview, Surgery Report, Intake/Output and MAR was reviewed with the receiving nurse. Lines:   Peripheral IV 06/20/22 Right Forearm (Active)   Site Assessment Clean, dry & intact 06/20/22 1255   Line Status Infusing 06/20/22 1255   Phlebitis Assessment No symptoms 06/20/22 1255   Infiltration Assessment 0 06/20/22 1255   Dressing Status Clean, dry & intact 06/20/22 1255   Dressing Type Transparent 06/20/22 1255        Opportunity for questions and clarification was provided. Patient transported with:   O2 @ 3 liters    VTE prophylaxis orders have been written for Amaury Deluna. Patient and family given floor number and nurses name. Family updated re: pt status after security code verified.

## 2022-06-20 NOTE — ANESTHESIA PRE PROCEDURE
Department of Anesthesiology  Preprocedure Note       Name:  Marian Villanueva   Age:  69 y.o.  :  1955                                          MRN:  [id number]         Date:  2022      Surgeon: Sweta Reese):  Alistair Smith MD    Procedure: Procedure(s): HERNIA HIATAL LAPAROSCOPIC  POSS  PEG TUBE PLACEMENT    Medications prior to admission:   Prior to Admission medications    Medication Sig Start Date End Date Taking?  Authorizing Provider   Red Yeast Rice Extract (RED YEAST RICE PO) Take by mouth daily    Historical Provider, MD   aspirin 81 MG EC tablet Take by mouth daily     Ar Automatic Reconciliation   ergocalciferol (ERGOCALCIFEROL) 1.25 MG (44827 UT) capsule TAKE 1 CAPSULE BY MOUTH ONCE WEEKLY 3/5/21   Ar Automatic Reconciliation   omeprazole (PRILOSEC) 40 MG delayed release capsule TAKE 1 CAPSULE BY MOUTH ONCE DAILY 21   Ar Automatic Reconciliation       Current medications:    Current Facility-Administered Medications   Medication Dose Route Frequency Provider Last Rate Last Admin    lidocaine 1 % injection 1 mL  1 mL IntraDERmal Once PRN Rosalia Beckham MD        acetaminophen (TYLENOL) tablet 1,000 mg  1,000 mg Oral Once Rosalia Beckham MD        fentaNYL (SUBLIMAZE) injection 100 mcg  100 mcg IntraVENous Once PRN Rosalia Beckham MD        lactated ringers infusion   IntraVENous Continuous Rosalia Beckham MD        sodium chloride flush 0.9 % injection 5-40 mL  5-40 mL IntraVENous 2 times per day Rosalia Beckham MD        sodium chloride flush 0.9 % injection 5-40 mL  5-40 mL IntraVENous PRN Rosalia Beckham MD        0.9 % sodium chloride infusion   IntraVENous PRN Rosalia Beckham MD        midazolam PF (VERSED) injection 2 mg  2 mg IntraVENous Once PRN Rosalia Beckham MD           Allergies:  No Known Allergies    Problem List:    Patient Active Problem List   Diagnosis Code    Hiatal hernia K44.9       Past Medical History:        Diagnosis Date    Aneurysm of infrarenal abdominal aorta ([facility] [address] Component Value Date    PROTIME 14.5 06/13/2022    INR 1.1 06/13/2022    APTT 25.3 06/13/2022       HCG (If Applicable): No results found for: PREGTESTUR, PREGSERUM, HCG, HCGQUANT     ABGs: No results found for: PHART, PO2ART, FSS0WXP, JGN5RGZ, BEART, I3LKCENP     Type & Screen (If Applicable):  No results found for: LABABO, LABRH    Drug/Infectious Status (If Applicable):  No results found for: HIV, HEPCAB    COVID-19 Screening (If Applicable): No results found for: COVID19        Anesthesia Evaluation  Patient summary reviewed and Nursing notes reviewed no history of anesthetic complications:   Airway: Mallampati: III  TM distance: >3 FB   Neck ROM: full     Dental:    (+) upper dentures and lower dentures      Pulmonary:Negative Pulmonary ROS breath sounds clear to auscultation                             Cardiovascular:Negative CV ROS  Exercise tolerance: good (>4 METS),           Rhythm: regular  Rate: normal    Stress test reviewed             ROS comment: Stress echo 12/'21- EF 60%, no ischemia     Neuro/Psych:   Negative Neuro/Psych ROS              GI/Hepatic/Renal:   (+) hiatal hernia,           Endo/Other: Negative Endo/Other ROS                    Abdominal:             Vascular:   + PVD, aortic or cerebral, . ROS comment: Infra-renal AAA 3.5 cm. Other Findings:           Anesthesia Plan      general     ASA 2       Induction: intravenous. MIPS: Postoperative opioids intended and Prophylactic antiemetics administered. Anesthetic plan and risks discussed with patient.                         Sienna Yi MD   6/20/2022

## 2022-06-21 VITALS
TEMPERATURE: 98.4 F | BODY MASS INDEX: 28.89 KG/M2 | RESPIRATION RATE: 18 BRPM | SYSTOLIC BLOOD PRESSURE: 125 MMHG | HEART RATE: 72 BPM | WEIGHT: 157 LBS | OXYGEN SATURATION: 92 % | HEIGHT: 62 IN | DIASTOLIC BLOOD PRESSURE: 70 MMHG

## 2022-06-21 LAB
ANION GAP SERPL CALC-SCNC: 8 MMOL/L (ref 7–16)
BASOPHILS # BLD: 0 K/UL (ref 0–0.2)
BASOPHILS NFR BLD: 0 % (ref 0–2)
BUN SERPL-MCNC: 10 MG/DL (ref 8–23)
CALCIUM SERPL-MCNC: 8.8 MG/DL (ref 8.3–10.4)
CHLORIDE SERPL-SCNC: 107 MMOL/L (ref 98–107)
CO2 SERPL-SCNC: 25 MMOL/L (ref 21–32)
CREAT SERPL-MCNC: 0.9 MG/DL (ref 0.8–1.5)
DIFFERENTIAL METHOD BLD: ABNORMAL
EOSINOPHIL # BLD: 0 K/UL (ref 0–0.8)
EOSINOPHIL NFR BLD: 0 % (ref 0.5–7.8)
ERYTHROCYTE [DISTWIDTH] IN BLOOD BY AUTOMATED COUNT: 12.4 % (ref 11.9–14.6)
GLUCOSE SERPL-MCNC: 92 MG/DL (ref 65–100)
HCT VFR BLD AUTO: 39.9 % (ref 41.1–50.3)
HGB BLD-MCNC: 14 G/DL (ref 13.6–17.2)
IMM GRANULOCYTES # BLD AUTO: 0 K/UL (ref 0–0.5)
IMM GRANULOCYTES NFR BLD AUTO: 0 % (ref 0–5)
LYMPHOCYTES # BLD: 1.6 K/UL (ref 0.5–4.6)
LYMPHOCYTES NFR BLD: 16 % (ref 13–44)
MCH RBC QN AUTO: 33.5 PG (ref 26.1–32.9)
MCHC RBC AUTO-ENTMCNC: 35.1 G/DL (ref 31.4–35)
MCV RBC AUTO: 95.5 FL (ref 79.6–97.8)
MONOCYTES # BLD: 1 K/UL (ref 0.1–1.3)
MONOCYTES NFR BLD: 10 % (ref 4–12)
NEUTS SEG # BLD: 7.1 K/UL (ref 1.7–8.2)
NEUTS SEG NFR BLD: 74 % (ref 43–78)
NRBC # BLD: 0 K/UL (ref 0–0.2)
PLATELET # BLD AUTO: 243 K/UL (ref 150–450)
PMV BLD AUTO: 9.1 FL (ref 9.4–12.3)
POTASSIUM SERPL-SCNC: 3.9 MMOL/L (ref 3.5–5.1)
RBC # BLD AUTO: 4.18 M/UL (ref 4.23–5.6)
SODIUM SERPL-SCNC: 140 MMOL/L (ref 136–145)
WBC # BLD AUTO: 9.7 K/UL (ref 4.3–11.1)

## 2022-06-21 PROCEDURE — 6360000002 HC RX W HCPCS: Performed by: PHYSICIAN ASSISTANT

## 2022-06-21 PROCEDURE — 2580000003 HC RX 258: Performed by: PHYSICIAN ASSISTANT

## 2022-06-21 PROCEDURE — 85025 COMPLETE CBC W/AUTO DIFF WBC: CPT

## 2022-06-21 PROCEDURE — 2500000003 HC RX 250 WO HCPCS: Performed by: PHYSICIAN ASSISTANT

## 2022-06-21 PROCEDURE — 80048 BASIC METABOLIC PNL TOTAL CA: CPT

## 2022-06-21 PROCEDURE — 36415 COLL VENOUS BLD VENIPUNCTURE: CPT

## 2022-06-21 PROCEDURE — 6370000000 HC RX 637 (ALT 250 FOR IP): Performed by: PHYSICIAN ASSISTANT

## 2022-06-21 RX ORDER — GABAPENTIN 300 MG/1
300 CAPSULE ORAL 3 TIMES DAILY
Qty: 60 CAPSULE | Refills: 0 | Status: SHIPPED | OUTPATIENT
Start: 2022-06-21 | End: 2022-07-11

## 2022-06-21 RX ORDER — OXYCODONE HYDROCHLORIDE 5 MG/1
5 TABLET ORAL EVERY 4 HOURS PRN
Qty: 12 TABLET | Refills: 0 | Status: SHIPPED | OUTPATIENT
Start: 2022-06-21 | End: 2022-06-24

## 2022-06-21 RX ADMIN — SODIUM CHLORIDE, PRESERVATIVE FREE 10 ML: 5 INJECTION INTRAVENOUS at 08:04

## 2022-06-21 RX ADMIN — GABAPENTIN 300 MG: 300 CAPSULE ORAL at 08:04

## 2022-06-21 RX ADMIN — CEFAZOLIN SODIUM 2000 MG: 100 INJECTION, POWDER, LYOPHILIZED, FOR SOLUTION INTRAVENOUS at 02:55

## 2022-06-21 RX ADMIN — ACETAMINOPHEN 650 MG: 325 TABLET ORAL at 08:04

## 2022-06-21 RX ADMIN — PANTOPRAZOLE SODIUM 40 MG: 40 TABLET, DELAYED RELEASE ORAL at 05:54

## 2022-06-21 RX ADMIN — ENOXAPARIN SODIUM 40 MG: 40 INJECTION SUBCUTANEOUS at 08:04

## 2022-06-21 RX ADMIN — ACETAMINOPHEN 650 MG: 325 TABLET ORAL at 02:55

## 2022-06-21 NOTE — DISCHARGE SUMMARY
James Cochran  MRN: [id number]     : 1955     Age: 69 y.o. Admit date: 2022     Discharge date: 2022   Attending Physician: Dr. Jack Segal MD  Primary Discharge Diagnosis:   Principal Problem:    Hiatal hernia  Resolved Problems:    * No resolved hospital problems. *    Primary Operations or Procedures Performed :  Procedure(s): HERNIA HIATAL LAPAROSCOPIC  POSS  PEG TUBE PLACEMENT     Brief History and Reason for Admission: James Cochran was admitted with the following history of present illness.     This patient was seen in my office 1 year ago with a hiatal hernia. At that time he was found to have a [id number] cm hiatal hernia with gastric volvulus on CT scan. At that time the patient stated his symptoms were well controlled with medication and wanted  to watch the hernia and not proceed with surgery at that time. The patient had a recent CT scan on 4/15/2022 which again revealed a large hiatal hernia with majority of the stomach in the thorax.       The patient states that his symptoms have been worsening over the last year. He states that he is getting episodes of abdominal pain an pressure that can happen at random, but is noticeable after eating as well. He states that he is afraid to eat due  to the symptoms. He states that he will get associated nausea, and will feel like he is about to pass out when he has the symptoms. He states that the episodes are sporadic. He will have the symptoms 2-3 days in a row, and then can go weeks with no symptoms. [facility] states he is not having issues with GERD at this time. He denies any SOB.     Hospital Course: The patient progressed satisfactorily meeting milestones necessary for successful discharge including tolerating a diet, adequate mobility, adequate pain control, and active bowel function. Patient was deemed a good candidate for discharge at the time of morning rounding.  They are to follow up as indicated in their provided discharge paperwork. The patient helped develop and voices understanding with the plan of care. They are amenable without reservations at this time to moving forward with discharge. Condition at Discharge: good    Discharge Medications:   Current Discharge Medication List      START taking these medications    Details   gabapentin (NEURONTIN) 300 MG capsule Take 1 capsule by mouth 3 times daily for 20 days. Qty: 60 capsule, Refills: 0      oxyCODONE (ROXICODONE) 5 MG immediate release tablet Take 1 tablet by mouth every 4 hours as needed for Pain for up to 3 days.   Qty: 12 tablet, Refills: 0    Comments: Reduce doses taken as pain becomes manageable  Associated Diagnoses: Hiatal hernia         CONTINUE these medications which have NOT CHANGED    Details   Red Yeast Rice Extract (RED YEAST RICE PO) Take by mouth daily      aspirin 81 MG EC tablet Take by mouth daily       ergocalciferol (ERGOCALCIFEROL) 1.25 MG (21570 UT) capsule TAKE 1 CAPSULE BY MOUTH ONCE WEEKLY      omeprazole (PRILOSEC) 40 MG delayed release capsule TAKE 1 CAPSULE BY MOUTH ONCE DAILY               Disposition/Discharge Instructions/Follow-up Care:      Home on soft diet  Follow up in 10 days  No lifting >20 lbs  May shower  Oxycodone as needed    Signed:  Sweta Fall PA-C  6/21/2022  10:11 AM

## 2022-06-21 NOTE — PROGRESS NOTES
Pt discharged home with steady gait. PT alert and oriented. Pt driven home by his wife. Pt has 5 clean and dry lap sites on abdomen. G tube clamped and taped to side. Pt verbalized understanding for strict return precautions. Pt and wife deny further questions.

## 2022-06-21 NOTE — OP NOTE
of the chest cavity and then I scored the hernia edge to open the peritoneum and then the peritoneum held in place. I started to dissect outside the hernia sac and this was carried mainly bluntly with combination of bipolar device. The hernial defect was nicely dissected off the mediastinum. On the greater curvatures, I divided the short gastric to mobilize the fundus. The fundus was pretty stuck onto the left diaphragm. This was carefully dissected off and then I was able to mobilize the GE junction on the anterior and lateral surface. This was still quite stuck posteriorly. I felt it is not necessary to mobilize the entire GE junction since we were not going to close the hiatus. After this mobilization, the stomach so easily stayed down below the diaphragm. Then I performed the EGD. I scrubbed and performed EGD myself. The regular gastroscope was used to advance under direct vision. The stomach was fully dilated with air and the stomach was nicely distended. The distal body of the stomach reached to the anterior abdominal wall without any tension. My assistant performed the G-tube. He made a cut on the epigastric area. A puncture needle was placed into the stomach followed by a guidewire. A guidewire was grabbed endoscopically by me and pulled out from the mouth and the G-tube was then attached to the wire and then pulled back into the stomach. The body of the stomach was able to pull up to the anterior abdominal wall without any tension. Again, the stomach was very well distended and it stays below the diaphragm nicely. I re-scrubbed in and I looked at the peritoneal cavity. No evidence of bleeding or bowel injury. All the trocars were removed. The Sherrie cannula site was closed on the fascia with 0 Vicryl stitch and all skin incision closed with 4-0 Vicryl stitch in subcuticular fashion. The patient tolerated the procedure well. He was transferred to recovery room in stable condition. All the instrument count and lap count were correct. As noted, Marilyn Mcmahon was the first assistant in this case. He offered excellent exposure and made the surgery quicker and safer.       Dayton Lincoln MD      [id number]  D:  06/20/2022 12:55  T:  06/20/2022 22:34  JOB #:  [id number]

## 2022-06-21 NOTE — PROGRESS NOTES
Patient with discharge orders today. Family at bedside to provide transportation. Patient has met all treatment goals and milestones. No supportive needs identified. CM following until discharged. ASSESSMENT NOTE    Attending Physician: Josh Patel MD  Admit Problem: Hiatal hernia [K44.9]  Date/Time of Admission: 6/20/2022  8:33 AM  Problem List:  Patient Active Problem List   Diagnosis    Hiatal hernia       Service Assessment  Patient Orientation Alert and Oriented   Cognition Alert   History Provided By Patient   Primary Caregiver Self   Accompanied By/Relationship Dyllan Nolen, spouse  [phone]   Support Systems Spouse/Significant Other   Patient's Healthcare Decision Maker is: Legal Next of Katiuska Mata   PCP Verified by CM Yes Christinakishore Ledezma  [phone])   Last Visit to PCP Within last 3 months   Prior Functional Level Independent in ADLs/IADLs   Current Functional Level     Can patient return to prior living arrangement Yes   Ability to make needs known: Good   Family able to assist with home care needs: Yes   Would you like for me to discuss the discharge plan with any other family members/significant others, and if so, who?      Financial Resources     Community Resources     CM/SW Referral       Social/Functional History  Lives With Spouse   Type of [address] One level   Home Access Stairs to enter without rails   Entrance Stairs - Number of Steps 3   Entrance Stairs - Rails     Bathroom Shower/Tub Tub/Shower unit   Bathroom Toilet Standard   Bathroom Equipment     [address] Help From Family   ADL Assistance Independent   Bath     Dressing     Grooming     Feeding     Toileting     [address]     Meal Prep     Laundry     Vacuuming     Cleaning     Gardening     Yard Work     Driving     Shopping          Other (Comment)     Homemaking Responsibilities     Meal Prep Responsibility     Laundry Responsibility     Cleaning Representative Agree with the Discharge Plan? Yes   Freedom of Choice list was provided with basic dialogue that supports the individualized plan of care/goals, treatment preferences, and shares the quality data associated with the providers?  Yes     Documentation for Discharge Appeal  Discharge Appealed by     Date notified by [facility] of appeal request:     Time notified by [facility] of appeal request:     Detailed Notice of Discharge given to:     Date Notice of Discharge given:     Time Notice of Discharge given:     [facility] [place]     Time records sent to 2 [facility]     Date Notified of Outcome     Time Notified of Outcome     Outcome of appeal           Amilcar Ross RN 06/21/22 10:17 AM

## 2022-07-01 ENCOUNTER — OFFICE VISIT (OUTPATIENT)
Dept: SURGERY | Age: 67
End: 2022-07-01

## 2022-07-01 VITALS — HEIGHT: 62 IN | BODY MASS INDEX: 27.79 KG/M2 | WEIGHT: 151 LBS

## 2022-07-01 DIAGNOSIS — Z48.89 AFTERCARE FOLLOWING SURGERY: Primary | ICD-10-CM

## 2022-07-01 PROCEDURE — 99024 POSTOP FOLLOW-UP VISIT: CPT | Performed by: SURGERY

## 2022-07-26 ENCOUNTER — OFFICE VISIT (OUTPATIENT)
Dept: SURGERY | Age: 67
End: 2022-07-26

## 2022-07-26 VITALS — WEIGHT: 150 LBS | HEIGHT: 62 IN | BODY MASS INDEX: 27.6 KG/M2

## 2022-07-26 DIAGNOSIS — Z48.89 AFTERCARE FOLLOWING SURGERY: Primary | ICD-10-CM

## 2022-07-26 PROCEDURE — 99024 POSTOP FOLLOW-UP VISIT: CPT | Performed by: SURGERY

## 2022-08-18 ENCOUNTER — TELEPHONE (OUTPATIENT)
Dept: SURGERY | Age: 67
End: 2022-08-18

## 2022-08-18 DIAGNOSIS — K44.9 DIAPHRAGMATIC HERNIA WITHOUT OBSTRUCTION OR GANGRENE: ICD-10-CM

## 2022-08-18 DIAGNOSIS — R11.0 NAUSEA: Primary | ICD-10-CM

## 2022-08-18 NOTE — TELEPHONE ENCOUNTER
He has some nausea and left abdominal pain. He gets bloated when this happens. Normal bowel movements, no fever. He is eating and drinking fine. He wanted to let Dr Darel Peabody know and if he needed follow up. He does see Dr David Schofield. I told him I will check with Dr Darel Peabody and let him know.

## 2022-08-26 ENCOUNTER — HOSPITAL ENCOUNTER (OUTPATIENT)
Dept: GENERAL RADIOLOGY | Age: 67
Discharge: HOME | End: 2022-08-29
Payer: MEDICARE

## 2022-08-26 DIAGNOSIS — R11.0 NAUSEA: ICD-10-CM

## 2022-08-26 DIAGNOSIS — K44.9 DIAPHRAGMATIC HERNIA WITHOUT OBSTRUCTION OR GANGRENE: ICD-10-CM

## 2022-08-26 PROCEDURE — A4641 RADIOPHARM DX AGENT NOC: HCPCS | Performed by: SURGERY

## 2022-08-26 PROCEDURE — 74220 X-RAY XM ESOPHAGUS 1CNTRST: CPT

## 2022-08-26 PROCEDURE — 6360000004 HC RX CONTRAST MEDICATION: Performed by: SURGERY

## 2022-08-26 RX ADMIN — BARIUM SULFATE 355 ML: 0.6 SUSPENSION ORAL at 09:32

## 2022-08-26 RX ADMIN — DIATRIZOATE MEGLUMINE AND DIATRIZOATE SODIUM 120 ML: 660; 100 LIQUID ORAL; RECTAL at 09:29

## 2022-09-16 ENCOUNTER — OFFICE VISIT (OUTPATIENT)
Dept: SURGERY | Age: 67
End: 2022-09-16

## 2022-09-16 VITALS — WEIGHT: 151 LBS | BODY MASS INDEX: 27.79 KG/M2 | HEIGHT: 62 IN

## 2022-09-16 DIAGNOSIS — Z48.89 AFTERCARE FOLLOWING SURGERY: Primary | ICD-10-CM

## 2022-09-16 PROCEDURE — 99024 POSTOP FOLLOW-UP VISIT: CPT | Performed by: SURGERY

## 2022-09-17 NOTE — PROGRESS NOTES
Back to discuss his barium swallow, which shows persistent hiatal hernia but no obstruction, no reflux. He had a large incarcerated hiatal hernia reduced with G tube pexy in June, but hiatal defect was not closed due to its size. He had quick recovery with improvement in eating initially. However, he developed intermittent nausea every a few days. Today he feels good, has been eating well over last 5 days. I think his stomach is not adequately pexy down in the peritoneal cavity, it may be still too mobile with intermittent twisting /incarceration into the hiatal defect. Discussed the option of redo surgery to close hiatus with mesh and its potential risks. He hesitates but promises to call back if symptoms returns.

## 2023-09-18 ENCOUNTER — TRANSCRIBE ORDERS (OUTPATIENT)
Dept: SCHEDULING | Age: 68
End: 2023-09-18

## 2023-09-18 DIAGNOSIS — Z13.6 SCREENING FOR AAA (AORTIC ABDOMINAL ANEURYSM): Primary | ICD-10-CM

## 2023-09-18 DIAGNOSIS — I71.9 AORTIC ANEURYSM WITHOUT RUPTURE, UNSPECIFIED PORTION OF AORTA (HCC): Primary | ICD-10-CM

## 2023-10-02 ENCOUNTER — HOSPITAL ENCOUNTER (OUTPATIENT)
Dept: ULTRASOUND IMAGING | Age: 68
Discharge: HOME | End: 2023-10-05
Attending: FAMILY MEDICINE
Payer: MEDICARE

## 2023-10-02 DIAGNOSIS — Z13.6 SCREENING FOR AAA (AORTIC ABDOMINAL ANEURYSM): ICD-10-CM

## 2023-10-02 PROCEDURE — 76706 US ABDL AORTA SCREEN AAA: CPT

## 2024-12-03 ENCOUNTER — HOSPITAL ENCOUNTER (OUTPATIENT)
Dept: ULTRASOUND IMAGING | Age: 69
Discharge: HOME | End: 2024-12-06
Attending: FAMILY MEDICINE
Payer: MEDICARE

## 2024-12-03 DIAGNOSIS — I71.40 ABDOMINAL AORTIC ANEURYSM (AAA) WITHOUT RUPTURE, UNSPECIFIED PART (HCC): ICD-10-CM

## 2024-12-03 PROCEDURE — 93976 VASCULAR STUDY: CPT

## 2024-12-04 PROCEDURE — 93976 VASCULAR STUDY: CPT | Performed by: RADIOLOGY

## (undated) DEVICE — BAG DRNGE 4000ML CONT IRRIG ROUNDED TEARDROP SHP DISP

## (undated) DEVICE — NEEDLE SPNL 22GA L3.5IN BLK HUB S STL REG WALL FIT STYL W/

## (undated) DEVICE — TROCAR: Brand: KII® SLEEVE

## (undated) DEVICE — GLOVE SURG SZ 6.5 L11.2IN THK8.6MIL LT BRN LTX FREE

## (undated) DEVICE — "MB-142 MOUTHPIECE": Brand: MOUTHPIECE

## (undated) DEVICE — MAJOR GENERAL: Brand: MEDLINE INDUSTRIES, INC.

## (undated) DEVICE — SHEET, T, LAPAROTOMY, STERILE: Brand: MEDLINE

## (undated) DEVICE — GOWN,ECLIPSE,NONRNF,XL,ST,30/CS: Brand: MEDLINE

## (undated) DEVICE — ELECTRODE PT RET AD L9FT HI MOIST COND ADH HYDRGEL CORDED

## (undated) DEVICE — NEEDLE HYPO 25GA L1.5IN BLU POLYPR HUB S STL REG BVL STR

## (undated) DEVICE — TROCAR: Brand: KII FIOS FIRST ENTRY

## (undated) DEVICE — SOLUTION IRRIG 1000ML 09% SOD CHL USP PIC PLAS CONTAINER

## (undated) DEVICE — SOLUTION ANTIFOG VIS SYS CLEARIFY LAPSCP

## (undated) DEVICE — INTENDED FOR TISSUE SEPARATION, AND OTHER PROCEDURES THAT REQUIRE A SHARP SURGICAL BLADE TO PUNCTURE OR CUT.: Brand: BARD-PARKER ® CARBON RIB-BACK BLADES

## (undated) DEVICE — ADHESIVE LIQ 2OZ ADJUNCT FOR DSG MASTISOL

## (undated) DEVICE — APPLICATOR MEDICATED 26 CC SOLUTION HI LT ORNG CHLORAPREP

## (undated) DEVICE — GLOVE SURG SZ 65 L12IN FNGR THK79MIL GRN LTX FREE

## (undated) DEVICE — SEALER TISS L37CM SHFT DIA5MM 360DEG ROT CVD JAW TAPR TIP

## (undated) DEVICE — STRIP,CLOSURE,WOUND,MEDI-STRIP,1/2X4: Brand: MEDLINE

## (undated) DEVICE — GENERAL LAPAROSCOPY: Brand: MEDLINE INDUSTRIES, INC.

## (undated) DEVICE — BLADE CLIPPER GEN PURP NS

## (undated) DEVICE — DUAL LUMEN STOMACH TUBE: Brand: SALEM SUMP

## (undated) DEVICE — SUTURE PERMAHAND SZ 0 L30IN NONABSORBABLE BLK SILK BRAID A306H

## (undated) DEVICE — SUTURE VCRL SZ 4-0 L27IN ABSRB UD L19MM PS-2 3/8 CIR PRIM J426H

## (undated) DEVICE — SUTURE ETHBND EXCEL SZ 0 L30IN NONABSORBABLE GRN L26MM SH X834H

## (undated) DEVICE — PUMP SUC IRR TBNG L10FT W/ HNDPC ASSEMB STRYKEFLOW 2

## (undated) DEVICE — TUBE PERC ENDO GASTSTMY 20FR

## (undated) DEVICE — TROCARS: Brand: KII® BLUNT TIP ACCESS SYSTEM

## (undated) DEVICE — Z DUPLICATE USE 2431315 SET INSUF TBNG HI FLO W/ SMK EVAC FOR PNEUMOCLEAR

## (undated) DEVICE — DISSECTOR ENDOSCP PREPERI KID SHP DISTENSION BLLN SPCMKR

## (undated) DEVICE — DRAPE TOWEL: Brand: CONVERTORS

## (undated) DEVICE — GARMENT,MEDLINE,DVT,INT,CALF,MED, GEN2: Brand: MEDLINE

## (undated) DEVICE — SUTURE SZ 0 27IN 5/8 CIR UR-6  TAPER PT VIOLET ABSRB VICRYL J603H

## (undated) DEVICE — TUBING INSUFFLATION SMK EVAC HI FLO SET PNEUMOCLEAR

## (undated) DEVICE — Device

## (undated) DEVICE — LEGGINGS, PAIR, 31X48, STERILE: Brand: MEDLINE

## (undated) DEVICE — SOLUTION IRRIG 3000ML 0.9% SOD CHL FLX CONT 0797208] ICU MEDICAL INC]

## (undated) DEVICE — LOGICUT SCISSOR LENGTH 320MM: Brand: LOGI - LAPAROSCOPIC INSTRUMENT SYSTEM

## (undated) DEVICE — GLOVE ORANGE PI 7 1/2   MSG9075

## (undated) DEVICE — TOTAL TRAY, DB, 100% SILI FOLEY, 16FR 10: Brand: MEDLINE

## (undated) DEVICE — GOWN,SIRUS,NONRNF,SETINSLV,XL,20/CS: Brand: MEDLINE